# Patient Record
Sex: FEMALE | Race: WHITE | NOT HISPANIC OR LATINO | Employment: UNEMPLOYED | URBAN - METROPOLITAN AREA
[De-identification: names, ages, dates, MRNs, and addresses within clinical notes are randomized per-mention and may not be internally consistent; named-entity substitution may affect disease eponyms.]

---

## 2022-12-30 ENCOUNTER — HOSPITAL ENCOUNTER (EMERGENCY)
Facility: CLINIC | Age: 37
Discharge: HOME | End: 2022-12-30
Attending: EMERGENCY MEDICINE | Admitting: EMERGENCY MEDICINE
Payer: COMMERCIAL

## 2022-12-30 ENCOUNTER — APPOINTMENT (OUTPATIENT)
Dept: ULTRASOUND IMAGING | Facility: CLINIC | Age: 37
End: 2022-12-30
Attending: STUDENT IN AN ORGANIZED HEALTH CARE EDUCATION/TRAINING PROGRAM
Payer: COMMERCIAL

## 2022-12-30 ENCOUNTER — APPOINTMENT (OUTPATIENT)
Dept: CT IMAGING | Facility: CLINIC | Age: 37
End: 2022-12-30
Attending: EMERGENCY MEDICINE
Payer: COMMERCIAL

## 2022-12-30 VITALS
RESPIRATION RATE: 18 BRPM | SYSTOLIC BLOOD PRESSURE: 111 MMHG | HEART RATE: 62 BPM | DIASTOLIC BLOOD PRESSURE: 75 MMHG | OXYGEN SATURATION: 98 %

## 2022-12-30 DIAGNOSIS — Z98.890 STATUS POST CRANIOTOMY: ICD-10-CM

## 2022-12-30 DIAGNOSIS — R20.2 PARESTHESIAS: ICD-10-CM

## 2022-12-30 DIAGNOSIS — Z87.898 HISTORY OF BRAIN TUMOR: ICD-10-CM

## 2022-12-30 PROBLEM — C71.9 GLIOMA (H): Status: ACTIVE | Noted: 2022-12-30

## 2022-12-30 PROBLEM — D49.6 BRAIN TUMOR (H): Status: ACTIVE | Noted: 2022-12-30

## 2022-12-30 LAB
ANION GAP SERPL CALCULATED.3IONS-SCNC: 12 MMOL/L (ref 7–15)
BASOPHILS # BLD AUTO: 0 10E3/UL (ref 0–0.2)
BASOPHILS NFR BLD AUTO: 0 %
BUN SERPL-MCNC: 11.7 MG/DL (ref 6–20)
CALCIUM SERPL-MCNC: 9.3 MG/DL (ref 8.6–10)
CHLORIDE SERPL-SCNC: 104 MMOL/L (ref 98–107)
CREAT SERPL-MCNC: 0.5 MG/DL (ref 0.51–0.95)
DEPRECATED HCO3 PLAS-SCNC: 24 MMOL/L (ref 22–29)
EOSINOPHIL # BLD AUTO: 0.1 10E3/UL (ref 0–0.7)
EOSINOPHIL NFR BLD AUTO: 1 %
ERYTHROCYTE [DISTWIDTH] IN BLOOD BY AUTOMATED COUNT: 14.2 % (ref 10–15)
GFR SERPL CREATININE-BSD FRML MDRD: >90 ML/MIN/1.73M2
GLUCOSE SERPL-MCNC: 86 MG/DL (ref 70–99)
HCT VFR BLD AUTO: 34.8 % (ref 35–47)
HGB BLD-MCNC: 11.1 G/DL (ref 11.7–15.7)
IMM GRANULOCYTES # BLD: 0.1 10E3/UL
IMM GRANULOCYTES NFR BLD: 1 %
LYMPHOCYTES # BLD AUTO: 2.5 10E3/UL (ref 0.8–5.3)
LYMPHOCYTES NFR BLD AUTO: 26 %
MAGNESIUM SERPL-MCNC: 1.9 MG/DL (ref 1.7–2.3)
MCH RBC QN AUTO: 30 PG (ref 26.5–33)
MCHC RBC AUTO-ENTMCNC: 31.9 G/DL (ref 31.5–36.5)
MCV RBC AUTO: 94 FL (ref 78–100)
MONOCYTES # BLD AUTO: 0.5 10E3/UL (ref 0–1.3)
MONOCYTES NFR BLD AUTO: 5 %
NEUTROPHILS # BLD AUTO: 6.6 10E3/UL (ref 1.6–8.3)
NEUTROPHILS NFR BLD AUTO: 67 %
NRBC # BLD AUTO: 0 10E3/UL
NRBC BLD AUTO-RTO: 0 /100
PLATELET # BLD AUTO: 229 10E3/UL (ref 150–450)
POTASSIUM SERPL-SCNC: 3.7 MMOL/L (ref 3.4–5.3)
RBC # BLD AUTO: 3.7 10E6/UL (ref 3.8–5.2)
SODIUM SERPL-SCNC: 140 MMOL/L (ref 136–145)
WBC # BLD AUTO: 9.7 10E3/UL (ref 4–11)

## 2022-12-30 PROCEDURE — 99285 EMERGENCY DEPT VISIT HI MDM: CPT | Mod: 25

## 2022-12-30 PROCEDURE — 36415 COLL VENOUS BLD VENIPUNCTURE: CPT | Performed by: STUDENT IN AN ORGANIZED HEALTH CARE EDUCATION/TRAINING PROGRAM

## 2022-12-30 PROCEDURE — 80048 BASIC METABOLIC PNL TOTAL CA: CPT | Performed by: STUDENT IN AN ORGANIZED HEALTH CARE EDUCATION/TRAINING PROGRAM

## 2022-12-30 PROCEDURE — 85025 COMPLETE CBC W/AUTO DIFF WBC: CPT | Performed by: STUDENT IN AN ORGANIZED HEALTH CARE EDUCATION/TRAINING PROGRAM

## 2022-12-30 PROCEDURE — 93971 EXTREMITY STUDY: CPT | Mod: LT

## 2022-12-30 PROCEDURE — 83735 ASSAY OF MAGNESIUM: CPT | Performed by: STUDENT IN AN ORGANIZED HEALTH CARE EDUCATION/TRAINING PROGRAM

## 2022-12-30 PROCEDURE — 70450 CT HEAD/BRAIN W/O DYE: CPT

## 2022-12-30 RX ORDER — LEVETIRACETAM 500 MG/1
TABLET ORAL
COMMUNITY
Start: 2022-12-22

## 2022-12-30 RX ORDER — DEXAMETHASONE 1 MG
TABLET ORAL
COMMUNITY
Start: 2022-12-22

## 2022-12-30 ASSESSMENT — ACTIVITIES OF DAILY LIVING (ADL)
ADLS_ACUITY_SCORE: 35
ADLS_ACUITY_SCORE: 33

## 2022-12-30 ASSESSMENT — ENCOUNTER SYMPTOMS
RESPIRATORY NEGATIVE: 1
GASTROINTESTINAL NEGATIVE: 1
WEAKNESS: 1
PSYCHIATRIC NEGATIVE: 1
HEADACHES: 1
FATIGUE: 1
ALLERGIC/IMMUNOLOGIC NEGATIVE: 1
MUSCULOSKELETAL NEGATIVE: 1
HEMATOLOGIC/LYMPHATIC NEGATIVE: 1
ENDOCRINE NEGATIVE: 1
EYES NEGATIVE: 1

## 2022-12-30 NOTE — ED PROVIDER NOTES
Emergency Department Attending Supervision Note  12/30/2022  6:52 AM      I evaluated this patient in conjunction with Ava Avitia MD ([id number]).  I have participated in the care of the patient and personally performed key elements of the history, exam, and medical decision making.       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      On my exam,     Patient Vitals for the past 24 hrs:   BP Pulse Resp SpO2   12/30/22 0934 111/75 -- -- --   12/30/22 0930 -- -- 18 --   12/30/22 0819 123/80 -- -- 98 %   12/30/22 0649 -- -- -- 99 %   12/30/22 0634 -- -- -- 98 %   12/30/22 0624 -- -- -- 99 %   12/30/22 0623 130/82 62 -- --       Constitutional: Vital signs reviewed as above.   HENT:    Head: No external signs of trauma noted.   Eyes: Pupils are equal, round, and reactive to light.   Cardiovascular: Normal rate, regular rhythm, normal heart sounds and intact distal pulses.    Pulmonary/Chest: Effort normal and breath sounds normal. No respiratory distress. No wheezes noted.   Gastrointestinal: Soft. There is no tenderness. There is no rebound.   Musculoskeletal:   No deformities appreciated   No edema noted   Mild tenderness in the proximal LUE.  Neurological:    Patient is alert and oriented to person, place, and time.    Speech is fluent, cognition is normal.   CN 2-12 intact (PERRL, EOMI, symmetric smile, equal eye squeeze and forehead raise, normal and equal sensation to bilateral forehead/cheek/chin, grossly equal hearing B/L, midline tongue protrusion with nl side-to-side movement, normal shoulder shrug).    RUE strength 4/5: , finger abd, wrist flex/ext, elbow flex/ext.    LUE strength 4/5: , finger abd, wrist flex/ext, elbow flex/ext.    RLE strength 4/5: ankle flex/ext, knee flex/ext, hip flex.    LLE strength 4/5: ankle flex/ext, knee flex/ext, hip flex.    Sensation equal in all 4  extremities.    No arm drift.     Cerebellar: Slow rapid alternating movements  (but able to perform  finger-nose-finger, rapid pronation/supination, hand rolling, heel-to-shin)   Skin: Skin is warm and dry. Staples in the anterior scalp surgical site (which appears C/D/I). Bruising noted on the left wrist. No erythema noted on the LUE.  Psychiatric: The patient appears calm          Results:    US Upper Extremity Venous Duplex Left   Final Result   IMPRESSION:    1.  No deep venous thrombosis in the left upper extremity.      MAZIN BATEMAN MD            SYSTEM ID:  [id number]      Head CT w/o contrast   Final Result   IMPRESSION:   1.  Postsurgical changes in the right temporal lobe correlating with   the history of a tumor debulking.   2.  Blood products are identified in the operative bed. They do not   demonstrate mass effect.   3.  Hemorrhage in the operative bed was described on the prior MRI but   a measurement was not given.   4.  The remainder of the head CT is normal.      Radiation dose for this scan was reduced using automated exposure   control, adjustment of the mA and/or kV according to patient size, or   iterative reconstruction technique      JAMES LIVINGSTON MD            SYSTEM ID:  [id number]          Labs Ordered and Resulted from Time of ED Arrival to Time of ED Departure   BASIC METABOLIC PANEL - Abnormal       Result Value    Sodium 140      Potassium 3.7      Chloride 104      Carbon Dioxide (CO2) 24      Anion Gap 12      Urea Nitrogen 11.7      Creatinine 0.50 (*)     Calcium 9.3      Glucose 86      GFR Estimate >90     CBC WITH PLATELETS AND DIFFERENTIAL - Abnormal    WBC Count 9.7      RBC Count 3.70 (*)     Hemoglobin 11.1 (*)     Hematocrit 34.8 (*)     MCV 94      MCH 30.0      MCHC 31.9      RDW 14.2      Platelet Count 229      % Neutrophils 67      % Lymphocytes 26      % Monocytes 5      % Eosinophils 1      % Basophils 0      % Immature Granulocytes 1      NRBCs per 100 WBC 0       Absolute Neutrophils 6.6      Absolute Lymphocytes 2.5      Absolute Monocytes 0.5      Absolute Eosinophils 0.1      Absolute Basophils 0.0      Absolute Immature Granulocytes 0.1      Absolute NRBCs 0.0     MAGNESIUM - Normal    Magnesium 1.9         ED course:    Medications - No data to display    ED Course as of 12/30/22 1240   Fri Dec 30, 2022   0651 Dr. Duff discussed the case with Dr. Adore MD. Discussed presentation, exam, ddx, plan.   0652 Dr. Duff' evaluation.   0900 Discussed patient with [facility] Neurosurgery team       Impression:    ICD-10-CM    1. Paresthesias  R20.2       2. Status post craniotomy  Z98.890       3. History of brain tumor  Z87.898             Ming Duff, DO   12/30/2022  [facility] EMERGENCY DEPT         Ming Duff, DO  12/30/22 1240

## 2022-12-30 NOTE — ED TRIAGE NOTES
Pt arrives to ED with numbness, pain, stiffness in left arm and bilateral legs. Pt reports this evening due to the amount of pain she was unable to sleep. Pt had brain tumor removed 12/19 at [facility]. Pt has mild pain but nothing like tonight. Pt started ambulating independently on her own just yesterday. A/ox 4.

## 2022-12-30 NOTE — ED PROVIDER NOTES
"  History     Chief Complaint:    Headache (Pain/)      HPI   Aleyda Bartlett is a 37 year old female who arrived to the ED with complaint of left upper extremity and bilateral lower extremity paresthesias. Patient recently underwent right frontotemporal craniotomy and tumor resection on 12/19 at [facility], and was discharged on 12/22 following an uncomplicated post-operative course. 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 Since hospital discharge, patient has been having pins/needles and cold sensation in bilateral lower extremities and left upper extremity. These symptoms have become more pronounced over time, prompting patient to seek evaluation in the ED this morning.     Review of Systems   Constitutional: Positive for fatigue.   HENT: Negative.    Eyes: Negative.    Respiratory: Negative.    Gastrointestinal: Negative.    Endocrine: Negative.    Genitourinary: Negative.    Musculoskeletal: Negative.    Skin: Negative.    Allergic/Immunologic: Negative.    Neurological: Positive for weakness and headaches (\"pressure\" sensation).   Hematological: Negative.    Psychiatric/Behavioral: Negative.    All other systems reviewed and are negative.    Allergies:    No Known Allergies      Medications:    dexamethasone (DECADRON) 1 MG tablet  levETIRAcetam (KEPPRA) 500 MG tablet        Past Medical History:      Past Medical History:   Diagnosis Date     Brain tumor (H)      Glioma (H)      Patient Active Problem List    Diagnosis Date Noted     Brain tumor (H) 12/30/2022     Priority: Medium     Glioma (H) 12/30/2022     Priority: Medium      Past Surgical History:      Past Surgical History:   Procedure Laterality Date     BRAIN TUMOR EXCISION Right      CRANIOTOMY Right      Family History:      History reviewed. No pertinent family history.    Social History:  Denies alcohol use, denies smoking, denies substance use    Physical Exam     Patient Vitals for the past 24 hrs:   BP Pulse Resp SpO2 "   12/30/22 0934 111/75 -- -- --   12/30/22 0930 -- -- 18 --   12/30/22 0819 123/80 -- -- 98 %   12/30/22 0649 -- -- -- 99 %   12/30/22 0634 -- -- -- 98 %   12/30/22 0624 -- -- -- 99 %   12/30/22 0623 130/82 62 -- --       Physical Exam  HENT:      Head:      Comments: Sutures present on right side of scalp, s/p craniotomy      Mouth/Throat:      Mouth: Mucous membranes are moist.   Eyes:      Extraocular Movements: Extraocular movements intact.      Pupils: Pupils are equal, round, and reactive to light.   Cardiovascular:      Rate and Rhythm: Normal rate and regular rhythm.      Pulses: Normal pulses.      Heart sounds: Normal heart sounds.   Pulmonary:      Effort: Pulmonary effort is normal.      Breath sounds: Normal breath sounds.   Abdominal:      General: There is no distension.      Palpations: Abdomen is soft.      Tenderness: There is no abdominal tenderness.   Musculoskeletal:         General: No swelling or tenderness.      Cervical back: Normal range of motion.   Skin:     General: Skin is warm and dry.   Neurological:      Mental Status: She is alert and oriented to person, place, and time.      Cranial Nerves: No cranial nerve deficit.      Sensory: No sensory deficit.      Motor: Weakness present.   Psychiatric:         Mood and Affect: Mood normal.         Behavior: Behavior normal.       Emergency Department Course     Imaging:  US Upper Extremity Venous Duplex Left   Final Result   IMPRESSION:    1.  No deep venous thrombosis in the left upper extremity.      MAZIN BATEMAN MD            SYSTEM ID:  [id number]      Head CT w/o contrast   Final Result   IMPRESSION:   1.  Postsurgical changes in the right temporal lobe correlating with   the history of a tumor debulking.   2.  Blood products are identified in the operative bed. They do not   demonstrate mass effect.   3.  Hemorrhage in the operative bed was described on the prior MRI but   a measurement was not given.   4.  The remainder of the head  CT is normal.      Radiation dose for this scan was reduced using automated exposure   control, adjustment of the mA and/or kV according to patient size, or   iterative reconstruction technique      JAMES LIVINGSTON MD            SYSTEM ID:  [id number]        Report per radiology    Laboratory:  Labs Ordered and Resulted from Time of ED Arrival to Time of ED Departure   BASIC METABOLIC PANEL - Abnormal       Result Value    Sodium 140      Potassium 3.7      Chloride 104      Carbon Dioxide (CO2) 24      Anion Gap 12      Urea Nitrogen 11.7      Creatinine 0.50 (*)     Calcium 9.3      Glucose 86      GFR Estimate >90     CBC WITH PLATELETS AND DIFFERENTIAL - Abnormal    WBC Count 9.7      RBC Count 3.70 (*)     Hemoglobin 11.1 (*)     Hematocrit 34.8 (*)     MCV 94      MCH 30.0      MCHC 31.9      RDW 14.2      Platelet Count 229      % Neutrophils 67      % Lymphocytes 26      % Monocytes 5      % Eosinophils 1      % Basophils 0      % Immature Granulocytes 1      NRBCs per 100 WBC 0      Absolute Neutrophils 6.6      Absolute Lymphocytes 2.5      Absolute Monocytes 0.5      Absolute Eosinophils 0.1      Absolute Basophils 0.0      Absolute Immature Granulocytes 0.1      Absolute NRBCs 0.0     MAGNESIUM - Normal    Magnesium 1.9         Procedures:      Emergency Department Course:    ED Course as of 12/30/22 1123   Fri Dec 30, 2022   0651 Dr. Duff discussed the case with Dr. Adore MD. Discussed presentation, exam, ddx, plan.   0652 Dr. Duff' evaluation.   0900 Discussed patient with [facility] Neurosurgery team       Interventions:    Medications - No data to display    Disposition:  The patient was discharged to home, with plan for outpatient follow up with [facility]    Impression & Plan    Medical Decision Making:    Patient s/p right frontotemporal craniotomy and tumor resection on 12/19 and presenting with paresthesias of left upper extremity and bilateral lower extremities. On physical exam, patient did not  appear to have any neurological deficits. Head CT demonstrates blood products in the operative bed, without mass effect. Post-operative changes noted on head CT, otherwise normal.    Patient was discussed with Neurosurgery team at [facility], who reviewed forwarded head CT from today. Per Neurosurgery, head CT is consistent with expected post-operative changes, stable from MRI obtained on 12/20. Recommended outpatient follow up at [facility], as previously scheduled. Dr. Villeda plans to reach out to patient today. Patient is scheduled with oncology at [facility] on 1/3/2022, and may also be able to be added on to [facility] clinic on the same day.     A left upper extremity ultrasound was obtained, which was negative for DVT.     Diagnosis:    ICD-10-CM    1. Paresthesias  R20.2       2. Status post craniotomy  Z98.890       3. History of brain tumor  Z87.898           Discharge Medications:  Discharge Medication List as of 12/30/2022  9:42 AM               Ava Avitia MD  Resident  12/30/22 1123       Ming Duff DO  12/30/22 1233

## 2022-12-30 NOTE — DISCHARGE INSTRUCTIONS
If you develop worsening headaches, vision changes, or any new neurological symptoms, return to either your closest ED for follow up, or to [facility].